# Patient Record
Sex: MALE | Race: WHITE | NOT HISPANIC OR LATINO | URBAN - METROPOLITAN AREA
[De-identification: names, ages, dates, MRNs, and addresses within clinical notes are randomized per-mention and may not be internally consistent; named-entity substitution may affect disease eponyms.]

---

## 2024-05-23 ENCOUNTER — APPOINTMENT (RX ONLY)
Dept: URBAN - METROPOLITAN AREA CLINIC 149 | Facility: CLINIC | Age: 24
Setting detail: DERMATOLOGY
End: 2024-05-23

## 2024-05-23 DIAGNOSIS — Z71.89 OTHER SPECIFIED COUNSELING: ICD-10-CM

## 2024-05-23 DIAGNOSIS — L08.9 LOCAL INFECTION OF THE SKIN AND SUBCUTANEOUS TISSUE, UNSPECIFIED: ICD-10-CM | Status: INADEQUATELY CONTROLLED

## 2024-05-23 PROCEDURE — ? TREATMENT REGIMEN

## 2024-05-23 PROCEDURE — ? COUNSELING

## 2024-05-23 PROCEDURE — ? PRESCRIPTION

## 2024-05-23 PROCEDURE — 99204 OFFICE O/P NEW MOD 45 MIN: CPT

## 2024-05-23 RX ORDER — DOXYCYCLINE HYCLATE 100 MG/1
CAPSULE, GELATIN COATED ORAL BID
Qty: 28 | Refills: 0 | Status: ERX | COMMUNITY
Start: 2024-05-23

## 2024-05-23 RX ORDER — CLINDAMYCIN PHOSPHATE 10 MG/ML
LOTION TOPICAL
Qty: 60 | Refills: 0 | Status: ERX | COMMUNITY
Start: 2024-05-23

## 2024-05-23 RX ADMIN — CLINDAMYCIN PHOSPHATE: 10 LOTION TOPICAL at 00:00

## 2024-05-23 RX ADMIN — DOXYCYCLINE HYCLATE: 100 CAPSULE, GELATIN COATED ORAL at 00:00

## 2024-05-23 ASSESSMENT — LOCATION ZONE DERM: LOCATION ZONE: FACE

## 2024-05-23 ASSESSMENT — LOCATION DETAILED DESCRIPTION DERM: LOCATION DETAILED: LEFT CHIN

## 2024-05-23 ASSESSMENT — LOCATION SIMPLE DESCRIPTION DERM: LOCATION SIMPLE: CHIN

## 2024-05-23 NOTE — PROCEDURE: TREATMENT REGIMEN
Initiate Treatment: Clean your rash areas with suggested cleanser or soap at least once per day before applying medicine.  AM and PM washing is best.  \\nSuggested cleanser or soap is(are)\\nSelenium Sulfide 1% OTC (Selsun Blue, be sure it has selenium in it)
Detail Level: Detailed

## 2024-05-28 ENCOUNTER — APPOINTMENT (RX ONLY)
Dept: URBAN - METROPOLITAN AREA CLINIC 149 | Facility: CLINIC | Age: 24
Setting detail: DERMATOLOGY
End: 2024-05-28

## 2024-05-28 DIAGNOSIS — Z71.89 OTHER SPECIFIED COUNSELING: ICD-10-CM

## 2024-05-28 DIAGNOSIS — L72.0 EPIDERMAL CYST: ICD-10-CM

## 2024-05-28 PROCEDURE — ? TREATMENT REGIMEN

## 2024-05-28 PROCEDURE — ? EDUCATIONAL RESOURCES PROVIDED

## 2024-05-28 PROCEDURE — ? PRESCRIPTION MEDICATION MANAGEMENT

## 2024-05-28 PROCEDURE — 99212 OFFICE O/P EST SF 10 MIN: CPT | Mod: 25

## 2024-05-28 PROCEDURE — ? INCISION AND DRAINAGE

## 2024-05-28 PROCEDURE — ? COUNSELING

## 2024-05-28 PROCEDURE — ? DIAGNOSIS COMMENT

## 2024-05-28 PROCEDURE — 10060 I&D ABSCESS SIMPLE/SINGLE: CPT

## 2024-05-28 PROCEDURE — ? PHOTO-DOCUMENTATION

## 2024-05-28 ASSESSMENT — LOCATION DETAILED DESCRIPTION DERM: LOCATION DETAILED: LEFT MENTAL CREASE

## 2024-05-28 ASSESSMENT — LOCATION SIMPLE DESCRIPTION DERM: LOCATION SIMPLE: CHIN

## 2024-05-28 ASSESSMENT — LOCATION ZONE DERM: LOCATION ZONE: FACE

## 2024-05-28 NOTE — PROCEDURE: PRESCRIPTION MEDICATION MANAGEMENT
Detail Level: Generalized
Continue Regimen: Applying Clindamycin lotion to affected area twice daily and continue taking Doxycycline
Render In Strict Bullet Format?: No

## 2024-05-28 NOTE — PROCEDURE: INCISION AND DRAINAGE
Post-Care Instructions: I reviewed with the patient in detail post-care instructions. Patient should keep wound covered and call the office should any redness, pain, swelling or worsening occur.
Suture Text: No sutures were required
Curette: No
Size Of Lesion In Cm (Optional But May Be Required For Some Insurances): 0
Consent was obtained and risks were reviewed including but not limited to delayed wound healing, infection, need for multiple I and D's, and pain.
Lesion Type: Cyst
Preparation Text: The area was prepped in the usual clean fashion.
Dressing: no dressing
Epidermal Closure: simple interrupted
Epidermal Sutures: steri-strips
Detail Level: Detailed
Curette Text (Optional): After the contents were expressed a curette was used to partially remove the cyst wall.
Method: 11 blade
Wound Care: Petrolatum

## 2024-06-19 ENCOUNTER — APPOINTMENT (RX ONLY)
Dept: URBAN - METROPOLITAN AREA CLINIC 149 | Facility: CLINIC | Age: 24
Setting detail: DERMATOLOGY
End: 2024-06-19

## 2024-06-19 DIAGNOSIS — L72.0 EPIDERMAL CYST: ICD-10-CM

## 2024-06-19 DIAGNOSIS — Z71.89 OTHER SPECIFIED COUNSELING: ICD-10-CM

## 2024-06-19 PROCEDURE — ? COUNSELING

## 2024-06-19 PROCEDURE — ? DIAGNOSIS COMMENT

## 2024-06-19 PROCEDURE — ? INTRALESIONAL KENALOG

## 2024-06-19 PROCEDURE — ? PHOTO-DOCUMENTATION

## 2024-06-19 PROCEDURE — ? TREATMENT REGIMEN

## 2024-06-19 PROCEDURE — ? PRESCRIPTION

## 2024-06-19 PROCEDURE — ? PRESCRIPTION MEDICATION MANAGEMENT

## 2024-06-19 PROCEDURE — 11900 INJECT SKIN LESIONS </W 7: CPT

## 2024-06-19 PROCEDURE — ? EDUCATIONAL RESOURCES PROVIDED

## 2024-06-19 PROCEDURE — 99212 OFFICE O/P EST SF 10 MIN: CPT | Mod: 25

## 2024-06-19 RX ORDER — CLINDAMYCIN PHOSPHATE 10 MG/ML
LOTION TOPICAL
Qty: 60 | Refills: 1 | Status: ERX

## 2024-06-19 RX ORDER — TRETIONIN 0.5 MG/G
CREAM TOPICAL DAILY
Qty: 20 | Refills: 1 | Status: ERX | COMMUNITY
Start: 2024-06-19

## 2024-06-19 RX ADMIN — TRETIONIN: 0.5 CREAM TOPICAL at 00:00

## 2024-06-19 ASSESSMENT — LOCATION DETAILED DESCRIPTION DERM: LOCATION DETAILED: LEFT CHIN

## 2024-06-19 ASSESSMENT — LOCATION ZONE DERM: LOCATION ZONE: FACE

## 2024-06-19 ASSESSMENT — LOCATION SIMPLE DESCRIPTION DERM: LOCATION SIMPLE: CHIN

## 2024-06-19 NOTE — PROCEDURE: INTRALESIONAL KENALOG
Detail Level: Detailed
How Many Mls Were Removed From The 40 Mg/Ml (5ml) Vial When Preparing The Injectable Solution?: 0
Ndc# For Kenalog Only: 2032-5915-08
Kenalog Type Of Vial: Multiple Dose
Total Volume (Ccs): 0.2
Medical Necessity Clause: This procedure was medically necessary because the lesions that were treated were:
Validate Note Data When Using Inventory: Yes
Show Inventory Tab: Hide
Administered By (Optional): Nico Galeano MD
Consent: The risks of atrophy were reviewed with the patient.
Lot # For Kenalog (Optional): [id number]
Expiration Date For Kenalog (Optional): january 2026
Bill For Wasted Drug (Kenalog)?: no
Concentration Of Kenalog Solution Injected (Mg/Ml): 10.0
Kenalog Preparation: Kenalog